# Patient Record
Sex: MALE | Race: WHITE | Employment: STUDENT | URBAN - NONMETROPOLITAN AREA
[De-identification: names, ages, dates, MRNs, and addresses within clinical notes are randomized per-mention and may not be internally consistent; named-entity substitution may affect disease eponyms.]

---
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
## 2019-01-21 RX ORDER — DIVALPROEX SODIUM 250 MG/1
TABLET, EXTENDED RELEASE ORAL
Qty: 30 TABLET | Refills: 5 | Status: SHIPPED | OUTPATIENT
Start: 2019-01-21 | End: 2019-08-19

## 2019-01-21 NOTE — TELEPHONE ENCOUNTER
Medication: Divalproex 250 mg    Date of last refill: 06/07/18 with 5 addt refills  [place]     Last office visit: 12/7/2018  Due back to clinic per last office note:  RTN in 6 months  Date next office visit scheduled: (  ) Fam meetings - patient not present --non F2F  Non Face to Face CPT code 78830/00127 applies as documented above     PROCEDURE DONE     (   ) see notes  Visits are done with \"open doors and windows\" exam rooms, except when patient requests privacy  F

## 2019-02-21 RX ORDER — VERAPAMIL HYDROCHLORIDE 120 MG/1
CAPSULE, EXTENDED RELEASE ORAL
Qty: 90 CAPSULE | Refills: 1 | Status: SHIPPED | OUTPATIENT
Start: 2019-02-21 | End: 2019-09-16

## 2019-02-21 NOTE — TELEPHONE ENCOUNTER
Medication: Verapamil 120 mg     Date of last refill: 04/23/18 with 2 addt refills  [place]      Last office visit: 12/7/2018  Due back to clinic per last office note:  RTN in 6 months  Date next office visit scheduled: (  ) Fam meetings - patient not present --non F2F  Non Face to Face CPT code 86380/45299 applies as documented above     PROCEDURE DONE     (   ) see notes  Visits are done with \"open doors and windows\" exam rooms, except when patient requests privacy  F

## 2019-05-13 RX ORDER — PIMOZIDE 2 MG/1
TABLET ORAL
Qty: 180 TABLET | Refills: 2 | Status: SHIPPED | OUTPATIENT
Start: 2019-05-13 | End: 2020-06-16

## 2019-05-13 NOTE — TELEPHONE ENCOUNTER
Medication: Pimozide 2  mg     Date of last refill: 07/02/18 with 2 addt refills # 180  [place]      Last office visit: 12/7/2018  Due back to clinic per last office note:  RTN in 6 months  Date next office visit schedul (  ) Fam meetings - patient not present --non F2F  Non Face to Face CPT code 79373/63660 applies as documented above     PROCEDURE DONE     (   ) see notes  Visits are done with \"open doors and windows\" exam rooms, except when patient requests privacy  F

## 2019-05-14 ENCOUNTER — PATIENT MESSAGE (OUTPATIENT)
Dept: FAMILY MEDICINE CLINIC | Facility: CLINIC | Age: 19
End: 2019-05-14

## 2019-05-15 ENCOUNTER — TELEPHONE (OUTPATIENT)
Dept: FAMILY MEDICINE CLINIC | Facility: CLINIC | Age: 19
End: 2019-05-15

## 2019-05-15 NOTE — TELEPHONE ENCOUNTER
From: Maricruz Soriano  To: Carlos Brizuela MD  Sent: 5/14/2019 9:34 PM CDT  Subject: Non-Urgent Medical Question    I need medical records indicating that I do have Tourette Syndrome.  I have had it all my life but for some reason it wasn’t in my health record

## 2019-05-15 NOTE — TELEPHONE ENCOUNTER
MED REC REQ RECEIVED FROM [facility] 5/15/19. SEND RECORDS TO PT.  EMR RECORDS ATTACHED. DOS REQ: ANY AND ALL RECORDS. PER LUNA, HE IS NO LONGER A PT HERE.

## 2019-06-20 ENCOUNTER — TELEPHONE (OUTPATIENT)
Dept: NEUROLOGY | Facility: CLINIC | Age: 19
End: 2019-06-20

## 2019-06-20 NOTE — TELEPHONE ENCOUNTER
Pt requested the last few ov and medication list faxed    [facility]  Fax:  [phone]  Fax sent  Fax confirmed    Authorization to use & Melly Lamb 1936 sent to scanning

## 2019-08-19 RX ORDER — DIVALPROEX SODIUM 250 MG/1
TABLET, EXTENDED RELEASE ORAL
Qty: 30 TABLET | Refills: 2 | Status: SHIPPED | OUTPATIENT
Start: 2019-08-19 | End: 2019-09-16

## 2019-08-20 ENCOUNTER — OFFICE VISIT (OUTPATIENT)
Dept: FAMILY MEDICINE CLINIC | Facility: CLINIC | Age: 19
End: 2019-08-20
Payer: COMMERCIAL

## 2019-08-20 VITALS
WEIGHT: 262 LBS | TEMPERATURE: 98 F | HEIGHT: 72 IN | BODY MASS INDEX: 35.49 KG/M2 | HEART RATE: 84 BPM | RESPIRATION RATE: 20 BRPM | SYSTOLIC BLOOD PRESSURE: 110 MMHG | DIASTOLIC BLOOD PRESSURE: 70 MMHG

## 2019-08-20 DIAGNOSIS — J01.00 ACUTE NON-RECURRENT MAXILLARY SINUSITIS: Primary | ICD-10-CM

## 2019-08-20 PROCEDURE — 99214 OFFICE O/P EST MOD 30 MIN: CPT | Performed by: INTERNAL MEDICINE

## 2019-08-20 RX ORDER — AZITHROMYCIN 250 MG/1
TABLET, FILM COATED ORAL
Qty: 6 TABLET | Refills: 0 | Status: SHIPPED | OUTPATIENT
Start: 2019-08-20 | End: 2019-09-30 | Stop reason: ALTCHOICE

## 2019-08-20 RX ORDER — PREDNISONE 20 MG/1
40 TABLET ORAL DAILY
Qty: 14 TABLET | Refills: 0 | Status: SHIPPED | OUTPATIENT
Start: 2019-08-20 | End: 2019-08-27

## 2019-08-21 NOTE — PROGRESS NOTES
HPI:   Anna Hernandez is a 20year old male who presents for upper respiratory symptoms for  2  weeks. Patient reports congestion, dry cough, ear pain, sinus pain.       Current Outpatient Medications:  predniSONE 20 MG Oral Tab Take 2 tablets (40 mg total) lesions  EYES:PERRLA, EOMI, normal optic disk,conjunctiva are clear  HEENT: atraumatic, normocephalic,ears and throat are clear  NECK: supple,no adenopathy,no bruits  LUNGS: clear to auscultation  CARDIO: RRR without murmur  GI: good BS's,no masses, HSM or

## 2019-08-27 ENCOUNTER — TELEPHONE (OUTPATIENT)
Dept: NEUROLOGY | Facility: CLINIC | Age: 19
End: 2019-08-27

## 2019-08-27 NOTE — TELEPHONE ENCOUNTER
Per Epic review, patient with refills on file. Patient is filling monthly not 90 day supply. Pharmacy will get prescription ready for patient. Refills remain on file.

## 2019-09-16 RX ORDER — DIVALPROEX SODIUM 250 MG/1
TABLET, EXTENDED RELEASE ORAL
Qty: 90 TABLET | Refills: 0 | Status: SHIPPED | OUTPATIENT
Start: 2019-09-16 | End: 2019-09-30

## 2019-09-16 RX ORDER — VERAPAMIL HYDROCHLORIDE 120 MG/1
CAPSULE, EXTENDED RELEASE ORAL
Qty: 90 CAPSULE | Refills: 0 | Status: SHIPPED | OUTPATIENT
Start: 2019-09-16 | End: 2019-09-30

## 2019-09-16 NOTE — TELEPHONE ENCOUNTER
Medication:Divalproex 250  mg     Date of last refill: 08/19/2019  with 2 addt refills  [place]      Last office visit: 12/7/2018  Due back to clinic per last office note:  RTN in 6 months  Date next office visit schedul (   ) see notes  Visits are done with \"open doors and windows\" exam rooms, except when patient requests privacy  Female patient visits were with done with NP, PA or MA/PSRs during the entirety of the visit                     Instructions          Return

## 2019-09-16 NOTE — TELEPHONE ENCOUNTER
Medication:Verapamil 120 mg     Date of last refill: 02/21/2019 with 1 addt refills  [place]      Last office visit: 12/7/2018  Due back to clinic per last office note:  RTN in 6 months  Date next office visit scheduled: (   ) see notes  Visits are done with \"open doors and windows\" exam rooms, except when patient requests privacy  Female patient visits were with done with NP, PA or MA/PSRs during the entirety of the visit                     Instructions          Return

## 2019-09-30 NOTE — PROGRESS NOTES
[facility] with [facility]  4/30/2000  Primary Care Provider:  Tim Barcenas MD    9/30/2019  Accompanied visit: No      20year old male patient being seen for: Myoclonus and T symmetric  FTN intact bilaterally. No drift on exam  Normal gait. Tandem gait intact.  Romberg negative      Problem/s Identified this visit:   Tourette disease  (primary encounter diagnosis)  Myoclonus  Anxiety    Discussion plus Diagnostics & Treatment Or

## 2019-12-16 RX ORDER — VERAPAMIL HYDROCHLORIDE 120 MG/1
CAPSULE, EXTENDED RELEASE ORAL
Qty: 90 CAPSULE | Refills: 3 | OUTPATIENT
Start: 2019-12-16

## 2019-12-16 NOTE — TELEPHONE ENCOUNTER
Medication: Verapamil 120 mg    Date of last refill: 09/30/2019 with 3 addt refills      Last office visit: 9/30/2019  Due back to clinic per last office note:  RTN in 1 year by 09/30/2020  Date next office visit scheduled:  No future appointments.     Last

## 2019-12-17 RX ORDER — DIVALPROEX SODIUM 250 MG/1
TABLET, EXTENDED RELEASE ORAL
Qty: 90 TABLET | Refills: 0 | OUTPATIENT
Start: 2019-12-17

## 2019-12-17 NOTE — TELEPHONE ENCOUNTER
Called and spoke with pharmacy. Patient with refills on file. Disregard current request.    Refill available for patient .

## 2019-12-23 RX ORDER — PIMOZIDE 2 MG/1
2 TABLET ORAL 2 TIMES DAILY
Qty: 180 TABLET | Refills: 2 | Status: CANCELLED | OUTPATIENT
Start: 2019-12-23

## 2019-12-23 RX ORDER — VERAPAMIL HYDROCHLORIDE 120 MG/1
120 CAPSULE, EXTENDED RELEASE ORAL
Qty: 90 CAPSULE | Refills: 3 | Status: CANCELLED | OUTPATIENT
Start: 2019-12-23

## 2019-12-23 RX ORDER — DIVALPROEX SODIUM 250 MG/1
250 TABLET, EXTENDED RELEASE ORAL
Qty: 90 TABLET | Refills: 3 | Status: CANCELLED | OUTPATIENT
Start: 2019-12-23

## 2019-12-23 NOTE — TELEPHONE ENCOUNTER
Spoke with Marvin Muñoz at pharmacy, confirmed that refills still on file for Depakote. Did not receive refill sent on 9/30/19 for Verapamil, called in with read back with pharmacist Yaneth Huff.      See separate TE 12/23/19 for alternative medication request. Karan Nageotte

## 2019-12-23 NOTE — TELEPHONE ENCOUNTER
Spoke with Ritu Meyers at pharmacy, states pt has refills on file.  Will respond to pt MyChart refill request.     Medication: Pimozide    Date of last refill: 5/13/19 or #180/2 additional refills  [place]    Last office vis

## 2020-01-15 ENCOUNTER — OFFICE VISIT (OUTPATIENT)
Dept: FAMILY MEDICINE CLINIC | Facility: CLINIC | Age: 20
End: 2020-01-15
Payer: COMMERCIAL

## 2020-01-15 VITALS
DIASTOLIC BLOOD PRESSURE: 70 MMHG | SYSTOLIC BLOOD PRESSURE: 110 MMHG | HEART RATE: 88 BPM | WEIGHT: 267 LBS | BODY MASS INDEX: 36 KG/M2 | TEMPERATURE: 97 F

## 2020-01-15 DIAGNOSIS — L98.9 SKIN LESION: Primary | ICD-10-CM

## 2020-01-15 PROCEDURE — 88305 TISSUE EXAM BY PATHOLOGIST: CPT | Performed by: INTERNAL MEDICINE

## 2020-01-15 PROCEDURE — 17260 DSTRJ MAL LES T/A/L 0.5 CM/<: CPT | Performed by: INTERNAL MEDICINE

## 2020-01-15 PROCEDURE — 99212 OFFICE O/P EST SF 10 MIN: CPT | Performed by: INTERNAL MEDICINE

## 2020-01-15 NOTE — PROGRESS NOTES
Marcus Gray is a 20year old male. HPI:   Large verrucous lesion on the left elbow, bleeds when bumped.   Current Outpatient Medications   Medication Sig Dispense Refill   • Verapamil HCl  MG Oral Capsule SR 24 Hr Take 1 capsule (120 mg total) b

## 2020-02-27 ENCOUNTER — OFFICE VISIT (OUTPATIENT)
Dept: FAMILY MEDICINE CLINIC | Facility: CLINIC | Age: 20
End: 2020-02-27
Payer: COMMERCIAL

## 2020-02-27 VITALS
WEIGHT: 264 LBS | RESPIRATION RATE: 22 BRPM | TEMPERATURE: 99 F | HEIGHT: 72 IN | BODY MASS INDEX: 35.76 KG/M2 | HEART RATE: 68 BPM | DIASTOLIC BLOOD PRESSURE: 70 MMHG | SYSTOLIC BLOOD PRESSURE: 110 MMHG

## 2020-02-27 DIAGNOSIS — S61.210A LACERATION OF RIGHT INDEX FINGER WITHOUT FOREIGN BODY WITHOUT DAMAGE TO NAIL, INITIAL ENCOUNTER: Primary | ICD-10-CM

## 2020-02-27 PROCEDURE — 99214 OFFICE O/P EST MOD 30 MIN: CPT | Performed by: INTERNAL MEDICINE

## 2020-02-27 NOTE — PROGRESS NOTES
Germán Sharpe is a 20year old male. HPI:   Pt cut his finger on a tomato slicer last night. Bleeding was controlled and he bandaged for work. He has no pain or redness and does not ant to have stitches!  Area looks very clean and without debri or fore foreign body without damage to nail, initial encounter  (primary encounter diagnosis)  Derma bonded finger an given a protective shield for work. No orders of the defined types were placed in this encounter.       Meds & Refills for this Visit:  Requested

## 2020-06-16 DIAGNOSIS — G25.3 MYOCLONUS: Primary | ICD-10-CM

## 2020-06-16 RX ORDER — PIMOZIDE 2 MG/1
2 TABLET ORAL 2 TIMES DAILY
Qty: 180 TABLET | Refills: 2 | OUTPATIENT
Start: 2020-06-16

## 2020-06-16 NOTE — TELEPHONE ENCOUNTER
Medication: Pimozide    Date of last refill: 5/13/2019 (#180/2)  [place] for this medication    Last office visit: 9/30/2019  Due back to clinic per last office note:  RTC yearly, due back Sept 2020    Date next offic

## 2020-06-17 RX ORDER — VERAPAMIL HYDROCHLORIDE 120 MG/1
120 CAPSULE, EXTENDED RELEASE ORAL
Qty: 90 CAPSULE | Refills: 3 | Status: SHIPPED | OUTPATIENT
Start: 2020-06-17 | End: 2021-07-13

## 2020-06-17 RX ORDER — DIVALPROEX SODIUM 250 MG/1
250 TABLET, EXTENDED RELEASE ORAL
Qty: 90 TABLET | Refills: 3 | Status: SHIPPED | OUTPATIENT
Start: 2020-06-17 | End: 2021-05-12

## 2020-06-17 NOTE — TELEPHONE ENCOUNTER
Medication: divalproex Sodium  MG Oral Tablet 24 Hr    Date of last refill: 9/30/2019 (#90/3)  [place] 9/98/8101    Last office visit: 9/30/2019  Due back to clinic per last office note:  1 year  Date next office v

## 2021-01-03 DIAGNOSIS — F95.2 TOURETTE DISEASE: ICD-10-CM

## 2021-01-04 RX ORDER — PIMOZIDE 2 MG/1
TABLET ORAL
Qty: 60 TABLET | Refills: 0 | Status: SHIPPED | OUTPATIENT
Start: 2021-01-04 | End: 2021-01-11

## 2021-01-04 NOTE — TELEPHONE ENCOUNTER
Medication:Pimozide 2 mg     Date of last refill: 06/16/20 with 1 addt refill  [place]      Last office visit: 9/30/2019  Due back to clinic per last office note:  1 year  Date next office visit scheduled:  No future manolo

## 2021-01-11 ENCOUNTER — OFFICE VISIT (OUTPATIENT)
Dept: NEUROLOGY | Facility: CLINIC | Age: 21
End: 2021-01-11
Payer: COMMERCIAL

## 2021-01-11 VITALS
WEIGHT: 264 LBS | HEART RATE: 74 BPM | HEIGHT: 72 IN | DIASTOLIC BLOOD PRESSURE: 74 MMHG | SYSTOLIC BLOOD PRESSURE: 112 MMHG | RESPIRATION RATE: 16 BRPM | BODY MASS INDEX: 35.76 KG/M2

## 2021-01-11 DIAGNOSIS — G25.3 MYOCLONUS: ICD-10-CM

## 2021-01-11 DIAGNOSIS — F95.2 TOURETTE DISEASE: ICD-10-CM

## 2021-01-11 PROCEDURE — 3008F BODY MASS INDEX DOCD: CPT | Performed by: PHYSICIAN ASSISTANT

## 2021-01-11 PROCEDURE — 3078F DIAST BP <80 MM HG: CPT | Performed by: PHYSICIAN ASSISTANT

## 2021-01-11 PROCEDURE — 3074F SYST BP LT 130 MM HG: CPT | Performed by: PHYSICIAN ASSISTANT

## 2021-01-11 PROCEDURE — 99213 OFFICE O/P EST LOW 20 MIN: CPT | Performed by: PHYSICIAN ASSISTANT

## 2021-01-11 RX ORDER — DIVALPROEX SODIUM 250 MG/1
250 TABLET, EXTENDED RELEASE ORAL
Qty: 90 TABLET | Refills: 3 | Status: CANCELLED | OUTPATIENT
Start: 2021-01-11

## 2021-01-11 RX ORDER — VERAPAMIL HYDROCHLORIDE 120 MG/1
120 CAPSULE, EXTENDED RELEASE ORAL
Qty: 90 CAPSULE | Refills: 3 | Status: CANCELLED | OUTPATIENT
Start: 2021-01-11

## 2021-01-11 RX ORDER — PIMOZIDE 2 MG/1
2 TABLET ORAL 2 TIMES DAILY
Qty: 180 TABLET | Refills: 3 | Status: SHIPPED | OUTPATIENT
Start: 2021-01-11

## 2021-01-11 NOTE — PROGRESS NOTES
[facility] with [facility]  4/30/2000  Primary Care Provider:  Bijan Campos MD    1/11/2021  Accompanied visit:     23year old male patient being seen for: Myoclonus and Tics adenopathy, thyroid normal  Heart and Lungs:  No murmurs RRR, clear to auscultation  Extremities: no cyanosis, skin changes    NEURO  NAD, A&OX3, No facial tics noted  No issues with eye blinking on exam  EOMI  CN II-XII intact  Strength 5/5 all extremitie

## 2021-02-15 DIAGNOSIS — F95.2 TOURETTE DISEASE: ICD-10-CM

## 2021-02-15 RX ORDER — PIMOZIDE 2 MG/1
TABLET ORAL
Qty: 60 TABLET | Refills: 0 | OUTPATIENT
Start: 2021-02-15

## 2021-05-04 ENCOUNTER — OFFICE VISIT (OUTPATIENT)
Dept: FAMILY MEDICINE CLINIC | Facility: CLINIC | Age: 21
End: 2021-05-04
Payer: COMMERCIAL

## 2021-05-04 VITALS
BODY MASS INDEX: 33.8 KG/M2 | DIASTOLIC BLOOD PRESSURE: 84 MMHG | HEART RATE: 97 BPM | RESPIRATION RATE: 18 BRPM | TEMPERATURE: 99 F | OXYGEN SATURATION: 98 % | WEIGHT: 255 LBS | HEIGHT: 73 IN | SYSTOLIC BLOOD PRESSURE: 136 MMHG

## 2021-05-04 DIAGNOSIS — Z20.822 SUSPECTED COVID-19 VIRUS INFECTION: Primary | ICD-10-CM

## 2021-05-04 PROCEDURE — 99213 OFFICE O/P EST LOW 20 MIN: CPT | Performed by: NURSE PRACTITIONER

## 2021-05-04 PROCEDURE — 87880 STREP A ASSAY W/OPTIC: CPT | Performed by: NURSE PRACTITIONER

## 2021-05-04 PROCEDURE — 87081 CULTURE SCREEN ONLY: CPT | Performed by: NURSE PRACTITIONER

## 2021-05-04 PROCEDURE — 87147 CULTURE TYPE IMMUNOLOGIC: CPT | Performed by: NURSE PRACTITIONER

## 2021-05-04 PROCEDURE — 3079F DIAST BP 80-89 MM HG: CPT | Performed by: NURSE PRACTITIONER

## 2021-05-04 PROCEDURE — 3075F SYST BP GE 130 - 139MM HG: CPT | Performed by: NURSE PRACTITIONER

## 2021-05-04 PROCEDURE — 3008F BODY MASS INDEX DOCD: CPT | Performed by: NURSE PRACTITIONER

## 2021-05-04 NOTE — PROGRESS NOTES
CHIEF COMPLAINT:   Patient presents with:  Sore Throat      HPI:   Justin Batista is a 24year old male who presents for upper respiratory symptoms Sx started 2 days ago, Sunday night. Sx include cold sweats, BA, back aches, Sinus congestion.   Though tenderness on palpation of maxillary or frontal sinuses.   EYES: conjunctiva clear, EOM intact  EARS: TM's mildly erythemic, no bulging, no retraction, bilateral visible fluid, bony landmarks visible  NOSE: Nostrils patent, clear nasal discharge, nasal muco

## 2021-05-10 ENCOUNTER — TELEPHONE (OUTPATIENT)
Dept: FAMILY MEDICINE CLINIC | Facility: CLINIC | Age: 21
End: 2021-05-10

## 2021-05-10 NOTE — TELEPHONE ENCOUNTER
Patient said that he had a Covid test on 5/4/21 that was positive. He wants to know when he can return to work. Advised that he should quarantine for 10 days from positive test. He would like a note that he can return to work Friday 5/14/21.

## 2021-05-10 NOTE — TELEPHONE ENCOUNTER
Harman Mcgrath is calling he would like to know when his end of quarantine date is?   May 2nd was the last day of his fever please call

## 2021-05-12 DIAGNOSIS — G25.3 MYOCLONUS: ICD-10-CM

## 2021-05-12 RX ORDER — DIVALPROEX SODIUM 250 MG/1
TABLET, EXTENDED RELEASE ORAL
Qty: 90 TABLET | Refills: 3 | Status: SHIPPED | OUTPATIENT
Start: 2021-05-12

## 2021-05-12 NOTE — TELEPHONE ENCOUNTER
Medication: Divalproex 250 mg    Date of last refill: 06/17/20 with 3 addt refills  [place]     Last office visit:01/11/21  Due back to clinic per last office note:  RTN in 1 year  Date next office visit scheduled:  No f

## 2021-07-13 ENCOUNTER — TELEPHONE (OUTPATIENT)
Dept: FAMILY MEDICINE CLINIC | Facility: CLINIC | Age: 21
End: 2021-07-13

## 2021-07-13 DIAGNOSIS — G25.3 MYOCLONUS: ICD-10-CM

## 2021-07-13 RX ORDER — VERAPAMIL HYDROCHLORIDE 120 MG/1
CAPSULE, EXTENDED RELEASE ORAL
Qty: 90 CAPSULE | Refills: 3 | Status: SHIPPED | OUTPATIENT
Start: 2021-07-13

## 2021-07-13 NOTE — TELEPHONE ENCOUNTER
Medication: Verapamil 120 mg     Date of last refill: 06/17/20 with 3 addt refills  [place]      Last office visit:01/11/21  Due back to clinic per last office note:  RTN in 1 year  Date next office visit scheduled:

## 2022-05-10 RX ORDER — PIMOZIDE 2 MG/1
TABLET ORAL
Qty: 60 TABLET | Refills: 0 | Status: SHIPPED | OUTPATIENT
Start: 2022-05-10

## 2022-08-15 ENCOUNTER — TELEPHONE (OUTPATIENT)
Dept: FAMILY MEDICINE CLINIC | Facility: CLINIC | Age: 22
End: 2022-08-15

## 2022-08-15 NOTE — TELEPHONE ENCOUNTER
Rest completely for 2 weeks, no running, may only do upper extremity exercises. No leg strengthening either, you can put him in first available.

## 2022-08-15 NOTE — TELEPHONE ENCOUNTER
Patient said that he has been having left leg pain for the last several weeks. He said he had been increasing running mileage to about 5 miles per day. He said the front of his left shin hurts when he flexes his toes upward. He has been icing and elevating it and doing exercises with lower impact and less running. He said that he has had shin splints in the past but not this bad.

## 2022-08-22 ENCOUNTER — OFFICE VISIT (OUTPATIENT)
Dept: FAMILY MEDICINE CLINIC | Facility: CLINIC | Age: 22
End: 2022-08-22
Payer: COMMERCIAL

## 2022-08-22 VITALS
HEIGHT: 73 IN | HEART RATE: 70 BPM | BODY MASS INDEX: 27.04 KG/M2 | DIASTOLIC BLOOD PRESSURE: 80 MMHG | SYSTOLIC BLOOD PRESSURE: 110 MMHG | WEIGHT: 204 LBS | RESPIRATION RATE: 18 BRPM | OXYGEN SATURATION: 99 % | TEMPERATURE: 99 F

## 2022-08-22 DIAGNOSIS — M84.362A STRESS FRACTURE, LEFT TIBIA, INITIAL ENCOUNTER FOR FRACTURE: Primary | ICD-10-CM

## 2022-08-22 PROCEDURE — 3008F BODY MASS INDEX DOCD: CPT | Performed by: INTERNAL MEDICINE

## 2022-08-22 PROCEDURE — 3074F SYST BP LT 130 MM HG: CPT | Performed by: INTERNAL MEDICINE

## 2022-08-22 PROCEDURE — 99214 OFFICE O/P EST MOD 30 MIN: CPT | Performed by: INTERNAL MEDICINE

## 2022-08-22 PROCEDURE — 3079F DIAST BP 80-89 MM HG: CPT | Performed by: INTERNAL MEDICINE

## 2023-11-16 ENCOUNTER — OFFICE VISIT (OUTPATIENT)
Dept: FAMILY MEDICINE CLINIC | Facility: CLINIC | Age: 23
End: 2023-11-16
Payer: COMMERCIAL

## 2023-11-16 VITALS
DIASTOLIC BLOOD PRESSURE: 70 MMHG | WEIGHT: 203 LBS | OXYGEN SATURATION: 97 % | HEART RATE: 74 BPM | TEMPERATURE: 99 F | SYSTOLIC BLOOD PRESSURE: 120 MMHG | BODY MASS INDEX: 27 KG/M2 | RESPIRATION RATE: 18 BRPM

## 2023-11-16 DIAGNOSIS — R06.02 SOB (SHORTNESS OF BREATH): Primary | ICD-10-CM

## 2023-11-16 PROCEDURE — 3078F DIAST BP <80 MM HG: CPT | Performed by: INTERNAL MEDICINE

## 2023-11-16 PROCEDURE — 99214 OFFICE O/P EST MOD 30 MIN: CPT | Performed by: INTERNAL MEDICINE

## 2023-11-16 PROCEDURE — 3074F SYST BP LT 130 MM HG: CPT | Performed by: INTERNAL MEDICINE

## 2023-11-16 RX ORDER — FLUTICASONE PROPIONATE 50 MCG
2 SPRAY, SUSPENSION (ML) NASAL DAILY
Qty: 1 EACH | Refills: 0 | Status: SHIPPED | OUTPATIENT
Start: 2023-11-16 | End: 2024-11-10

## 2023-11-16 RX ORDER — LEVOFLOXACIN 500 MG/1
500 TABLET, FILM COATED ORAL DAILY
Qty: 10 TABLET | Refills: 0 | Status: SHIPPED | OUTPATIENT
Start: 2023-11-16 | End: 2023-11-26

## 2023-11-16 RX ORDER — PREDNISONE 20 MG/1
40 TABLET ORAL DAILY
Qty: 14 TABLET | Refills: 0 | Status: SHIPPED | OUTPATIENT
Start: 2023-11-16 | End: 2023-11-23

## 2024-07-29 ENCOUNTER — OFFICE VISIT (OUTPATIENT)
Dept: FAMILY MEDICINE CLINIC | Facility: CLINIC | Age: 24
End: 2024-07-29
Payer: COMMERCIAL

## 2024-07-29 ENCOUNTER — TELEPHONE (OUTPATIENT)
Dept: FAMILY MEDICINE CLINIC | Facility: CLINIC | Age: 24
End: 2024-07-29

## 2024-07-29 VITALS
DIASTOLIC BLOOD PRESSURE: 72 MMHG | WEIGHT: 190 LBS | BODY MASS INDEX: 25.18 KG/M2 | RESPIRATION RATE: 18 BRPM | HEIGHT: 73 IN | TEMPERATURE: 97 F | SYSTOLIC BLOOD PRESSURE: 136 MMHG | HEART RATE: 81 BPM | OXYGEN SATURATION: 99 %

## 2024-07-29 DIAGNOSIS — L25.5 DERMATITIS DUE TO PLANTS, INCLUDING POISON IVY, SUMAC, AND OAK: Primary | ICD-10-CM

## 2024-07-29 RX ORDER — PREDNISONE 10 MG/1
TABLET ORAL
Qty: 45 TABLET | Refills: 0 | Status: SHIPPED | OUTPATIENT
Start: 2024-07-29 | End: 2024-08-12

## 2024-07-29 NOTE — TELEPHONE ENCOUNTER
Patient states he has had a rash from Poison Ivy for over a week and he is very uncomfortable. He does not want to wait until tomorrow for an appointment so he will go to the walk in clinic in [place] today.

## 2024-07-29 NOTE — PROGRESS NOTES
CHIEF COMPLAINT:     Chief Complaint   Patient presents with    Rash     Started a week ago           HPI:    Bubba Padgett is a 24 year old male who presents for evaluation of a rash.  Per patient rash started in the past 7 days. Rash has been worsening since onset.  Patient has had similar rash in the past. The rash is characterized by itchy, red, spots. The affected locations include back, arms, chest and stomach. Patient has treated rash with otc hydrocortisone cream, poison ivy spray.  Associated symptoms include: itching  Exposure: poison ivy.    Pertinent negatives include no anorexia, congestion, cough, diarrhea, eye pain, facial edema, fatigue, fever, joint pain, rhinorrhea, shortness of breath, sore throat or vomiting.      Current Outpatient Medications   Medication Sig Dispense Refill    predniSONE 10 MG Oral Tab Take 5 tablets (50 mg total) by mouth daily for 3 days, THEN 4 tablets (40 mg total) daily for 3 days, THEN 3 tablets (30 mg total) daily for 3 days, THEN 2 tablets (20 mg total) daily for 3 days, THEN 1 tablet (10 mg total) daily for 3 days. 45 tablet 0    fluticasone propionate 50 MCG/ACT Nasal Suspension 2 sprays by Each Nare route daily. 1 each 0      Past Medical History:    Tourette syndrome      Past Surgical History:   Procedure Laterality Date    [place] teeth removed        Family History   Problem Relation Age of Onset    Breast Cancer Maternal Grandmother     Diabetes Maternal Grandmother     Other (Other) Maternal Grandfather 50        Stomach Cancer    Diabetes Mother       Social History     Socioeconomic History    Marital status: Single   Tobacco Use    Smoking status: Former    Smokeless tobacco: Former    Tobacco comments:     quit 2 weeks ago   Vaping Use    Vaping status: Former   Substance and Sexual Activity    Alcohol use: Not Currently     Alcohol/week: 0.0 standard drinks of alcohol     Comment: socially     Drug use: Not Currently     Comment: Quit 2 weeks ago     Sexual activity: Yes     Partners: Female     Birth control/protection: Condom   Other Topics Concern    Caffeine Concern Yes     Comment: 3 cups a day    Exercise Yes     Comment: 4-5 x a week         REVIEW OF SYSTEMS:   GENERAL: feels well otherwise, no fever, no chills.  SKIN: Per HPI. No edema. No ulcerations.  HEENT: Denies rhinorrhea, edema of the lips or swelling of throat.  CARDIOVASCULAR: Denies chest pains or palpitations.  LUNGS: Denies shortness of breath with exertion or rest. No cough or wheezing.  LYMPH: Denies enlargement of the lymph nodes.  NEURO: Denies abnormal sensation, tingling of the skin, or numbness.      EXAM:   /72   Pulse 81   Temp 97.3 °F (36.3 °C)   Resp 18   Ht 6' 1\" (1.854 m)   Wt 190 lb (86.2 kg)   SpO2 99%   BMI 25.07 kg/m²   GENERAL: well developed, well nourished,in no apparent distress  SKIN: several scattered pruritic erythematous vesicular and papular lesions on chest/stomach, back, arms.  EYES: PERRLA, EOMI, conjunctiva are clear  HENT: Head atraumatic, normocephalic. TM's WNL bilaterally. Normal external nose. Nasal mucosa pink without edema. No erythema of the throat. Oropharynx moist without lesions.  LUNGS: Clear to auscultation bilaterally.  No wheezing, rhonchi, or rales.  No diminished breath sounds. No increased work of breathing.   CARDIO: RRR without murmur  JOINTS: normal ROM   LYMPH: No  lymphadenopathy.     ASSESSMENT AND PLAN:   Bubba Padgett is a 24 year old male who presents for evaluation of a rash. Findings are consistent with:    ASSESSMENT:  Encounter Diagnosis   Name Primary?    Dermatitis due to plants, including poison ivy, sumac, and oak Yes       PLAN: Meds as listed below.  Comfort measures as described in Patient Instructions.  Skin care discussed with patient.     Meds & Refills for this Visit:  Requested Prescriptions     Signed Prescriptions Disp Refills    predniSONE 10 MG Oral Tab 45 tablet 0     Sig: Take 5 tablets (50 mg  total) by mouth daily for 3 days, THEN 4 tablets (40 mg total) daily for 3 days, THEN 3 tablets (30 mg total) daily for 3 days, THEN 2 tablets (20 mg total) daily for 3 days, THEN 1 tablet (10 mg total) daily for 3 days.         Risk and benefits of medication discussed.     The patient indicates understanding of these issues and agrees to the plan.  The patient is asked to return in 3 days if sx's persist or worsen.

## (undated) DIAGNOSIS — F95.2 TOURETTE DISEASE: ICD-10-CM

## (undated) NOTE — MR AVS SNAPSHOT
[facility]  [address] [address]  [phone]               Thank you for choosing us for your health care visit with Kory Stone MD.  We are glad to serve you and happy to provide you with this summary o Mike ([id number]), [phone], [phone]  [address] [address]     Phone:  [phone]    - topiramate 100 MG Tabs  - topiramate 50 MG Tabs            MyChart     Sign up for [employer] access for your chi o creating a rainbow shopping list to find colorful fruits and vegetables  o go on a walking scavenger hunt through the neighborhood   o grow a family garden    In addition to 5, 4, 3, 2, 1 families can make small changes in their family routines to help e

## (undated) NOTE — LETTER
Date: 5/10/2021    Patient Name: Lawson Knapp          To Whom it may concern: This letter has been written at the patient's request. The above patient was seen at the [facility] for treatment of a medical condition.  Patient is require

## (undated) NOTE — MR AVS SNAPSHOT
[address] [facility] [address] [phone]               Thank you for choosing us for your health care visit with CAPRI Moran.   We are glad to serve you and happy to provide you with this summary of your visit ? Written prescriptions must be picked up in office. ? Please allow the office 2-3 business days to fill the prescription. ? Patient must present photo ID at time of .     ? Written prescriptions picked up on Fridays must be picked up between the Current Medications          This list is accurate as of: 6/6/17  1:54 PM.  Always use your most recent med list.                Pimozide 1 MG Tabs   Take 1 tab po bid           * topiramate 50 MG Tabs   1 in am 2 in pm.   Commonly known as:  TOPAMAX

## (undated) NOTE — LETTER
Date: 5/4/2021    Patient Name: Bradly Quintainlla          To Whom it may concern: This letter has been written at the patient's request. The above patient was seen at the [facility] for treatment of a medical condition.       Date of return